# Patient Record
(demographics unavailable — no encounter records)

---

## 2025-01-22 NOTE — HISTORY OF PRESENT ILLNESS
[FreeTextEntry8] : 58M presents for nodules on bilateral arm, right shoulder and scalp. Denies pain. The nodule on the right shoulder has started to bleed intermittently.

## 2025-01-22 NOTE — ASSESSMENT
[FreeTextEntry1] : skin nodules: Referred to dermatology to rule out bcc.  HCM: Discussed FIT for colon ca screening. He will schedule for CPE.

## 2025-01-22 NOTE — REVIEW OF SYSTEMS
[Fever] : no fever [Chills] : no chills [Night Sweats] : no night sweats [Itching] : no itching [Skin Rash] : skin rash

## 2025-01-30 NOTE — HISTORY OF PRESENT ILLNESS
[de-identified] : 58 yr old male PMH hx of aortic aneurysm presents for spots on body presents for many years. Reports they are coming and going. He is not sure whether he has had a skin cancer in the past. He has 4 spots that have developed in the past few years and have not been fully healing.

## 2025-01-30 NOTE — ASSESSMENT
[FreeTextEntry1] : #neoplasm of uncertain behavior right shoulder broad erythematous scaly plaque about 4 cm with rolled borders and telangiectasias bacterial culture taken today to rule out secondary impetiginization  # Neoplasm of uncertain behavior ddx Procedure Note: Biopsy by SHAVE, RIGHT ARM  The risks/benefits/alternatives of skin biopsy were explained to the patient, which include and are not limited to bleeding, infection, scarring or discoloration of skin, and recurrence of lesion. Patient expressed understanding of these risks and provided consent to the procedure. Time out with verification of patient and lesion site was performed. Site was prepped with rubbing alcohol, lidocaine with epinephrine was injected for anesthesia, and biopsy was performed. Specimen sent to path. Procedure was without complication and well tolerated. Wound care was discussed. Rule out bcc vs scc  # Neoplasm of uncertain behavior ddx Procedure Note: Biopsy by SHAVE BIOPSY, RIGHT ARM  The risks/benefits/alternatives of skin biopsy were explained to the patient, which include and are not limited to bleeding, infection, scarring or discoloration of skin, and recurrence of lesion. Patient expressed understanding of these risks and provided consent to the procedure. Time out with verification of patient and lesion site was performed. Site was prepped with rubbing alcohol, lidocaine with epinephrine was injected for anesthesia, and biopsy was performed. Specimen sent to path. Procedure was without complication and well tolerated. Wound care was discussed. Rule out scc vs bcc  # Neoplasm of uncertain behavior ddx Procedure Note: Biopsy by SHAVE BIOPSY, LEFT ARM  The risks/benefits/alternatives of skin biopsy were explained to the patient, which include and are not limited to bleeding, infection, scarring or discoloration of skin, and recurrence of lesion. Patient expressed understanding of these risks and provided consent to the procedure. Time out with verification of patient and lesion site was performed. Site was prepped with rubbing alcohol, lidocaine with epinephrine was injected for anesthesia, and biopsy was performed. Specimen sent to path. Procedure was without complication and well tolerated. Wound care was discussed.  # Neoplasm of uncertain behavior ddx Procedure Note: Biopsy by SHAVE BIOPSY, SCALP The risks/benefits/alternatives of skin biopsy were explained to the patient, which include and are not limited to bleeding, infection, scarring or discoloration of skin, and recurrence of lesion. Patient expressed understanding of these risks and provided consent to the procedure. Time out with verification of patient and lesion site was performed. Site was prepped with rubbing alcohol, lidocaine with epinephrine was injected for anesthesia, and biopsy was performed. Specimen sent to path. Procedure was without complication and well tolerated. Wound care was discussed.   Reschedule for FBSE in 3 weeks

## 2025-02-13 NOTE — ASSESSMENT
[FreeTextEntry1] :   Excision Operative RIGHT ARM BCC Indications:  Alternative therapies were discussed. Given the size, location, and tumor type we decided that excision offers the best option for treatment.   Preoperative diagnosis: BCC Postoperative diagnosis: BCC Same Location: right arm Anesthetic: 1% lidocaine with 1:100,000 epinephrine Antiseptic: Chlorhexidine or Betadine Attending surgeon: Nelsy Wren MD Assistant(s): Jackson Kelly M.D. , MA Estimated blood loss: < 5cc  Complications: None Initial lesion size: 2.5 cm x 1.5 cm Surgical margin: 0.4 cm Total excision size (always includes surgical margin):  3.3 by 2.3 cm Closure type: running  Length of closure: 7 cm Suture material: 4.0 Vicryl, 4.0 prolene  The patient was brought back to the minor surgery operating room. Prior to the procedure the medical record was reviewed by the physician. A pre-procedure checklist in the presence of the patient was reviewed. A surgery " timeout " was observed. The following were confirmed during the surgery timeout: patient name, confirmation of consent, patient position, allergies, type of anesthesia, and antibiotic given (if any, see emr entry for any medications).  The risks of the procedure, including bleeding, infection, nerve damage, possibility of recurrence, failure of the repair, and the certainty of a scar were discussed with the patient. Alternatives to the procedure, as well as their risks and benefits, were also discussed. The patient was offered an opportunity to ask any questions and, after expressing understanding of the proposed procedure and its alternatives, the patient signed the consent form. The patient was placed in appropriate position and the area was prepared and draped in the usual manner. Local anesthesia was obtained with the above mentioned anesthetic. Using a sterile surgical marking pen, an elliptical (or circular) excision was designed around the lesion and along relaxed skin tension lines, if possible, incorporating the margin of normal-appearing skin mentioned above. A full thickness skin incision using a #15 blade Bard-Mitchell scalpel was performed and the lesion was excised sharply in the subcutaneous plane.  The specimen was submitted for histopathologic examination.   The defect was moderately undermined in the subcutaneous plane if needed to reduce wound closure tension and allow for easy wound edge eversion.  Hemostasis was maintained with the electrosurgical unit.  Interrupted, inverted, subcuticular buried mattress sutures were placed using the material mentioned above to approximate the dermal edges of the wound. Interrupted and running simple cutaneous sutures were used to further approximate and fernanda the wound edges.  The final length of the repair is listed in the summary above. A firm pressure dressing was applied over vaseline ointment and Telfa. Verbal postoperative wound care instructions were given and a wound care hand out was dispensed.  The patient was asked to follow up for a wound check as specified. The patient was also told to call us at anytime for signs of wound infection or any other concerns they might have regarding the surgery or the healing process.   The patient tolerated the procedure well and ambulated from the operatory without problem.          LEFT ARM BCC Excision Operative Note   Indications:  Alternative therapies were discussed. Given the size, location, and tumor type we decided that excision offers the best option for treatment.   Preoperative diagnosis: BCC Postoperative diagnosis: Same Location: left arm Anesthetic: 1% lidocaine with 1:100,000 epinephrine Antiseptic: Chlorhexidine or Betadine Attending surgeon: Nelsy Wren MD , Resident: Jackson Kelly M.D. Estimated blood loss: < 5cc  Complications: None Initial lesion size: 3.5 cm x 2 cm Surgical margin: 0.4 cm Total excision size (always includes surgical margin):  4.3 by 2.8 cm Closure type: running and interrupted Length of closure: 7 cm Suture material: 4.0 Vicryl, 4.0 prolene  The patient was brought back to the minor surgery operating room. Prior to the procedure the medical record was reviewed by the physician. A pre-procedure checklist in the presence of the patient was reviewed. A surgery " timeout " was observed. The following were confirmed during the surgery timeout: patient name, confirmation of consent, patient position, allergies, type of anesthesia, and antibiotic given (if any, see emr entry for any medications).  The risks of the procedure, including bleeding, infection, nerve damage, possibility of recurrence, failure of the repair, and the certainty of a scar were discussed with the patient. Alternatives to the procedure, as well as their risks and benefits, were also discussed. The patient was offered an opportunity to ask any questions and, after expressing understanding of the proposed procedure and its alternatives, the patient signed the consent form. The patient was placed in appropriate position and the area was prepared and draped in the usual manner. Local anesthesia was obtained with the above mentioned anesthetic. Using a sterile surgical marking pen, an elliptical (or circular) excision was designed around the lesion and along relaxed skin tension lines, if possible, incorporating the margin of normal-appearing skin mentioned above. A full thickness skin incision using a #15 blade Bard-Mitchell scalpel was performed and the lesion was excised sharply in the subcutaneous plane.  The specimen was submitted for histopathologic examination.   The defect was moderately undermined in the subcutaneous plane if needed to reduce wound closure tension and allow for easy wound edge eversion.  Hemostasis was maintained with the electrosurgical unit.  Interrupted, inverted, subcuticular buried mattress sutures were placed using the material mentioned above to approximate the dermal edges of the wound. Interrupted and running simple cutaneous sutures were used to further approximate and fernanda the wound edges.  The final length of the repair is listed in the summary above. A firm pressure dressing was applied over vaseline ointment and Telfa. Verbal postoperative wound care instructions were given and a wound care hand out was dispensed.  The patient was asked to follow up for a wound check as specified. The patient was also told to call us at anytime for signs of wound infection or any other concerns they might have regarding the surgery or the healing process.   The patient tolerated the procedure well and ambulated from the operatory without problem.          - Discussed aftercare including, no heavy lifting (greater than 10 lbs for 2 weeks), no soaking for 2 weeks.

## 2025-02-24 NOTE — ASSESSMENT
[FreeTextEntry1] : HTN, HLD: He follows with Dr Hammond.  HCM: Discussed contacting [facility] for appointment with GI for colonoscopy for positive. FIT. Labs reviewed. Up to date on shingrix.

## 2025-02-24 NOTE — HEALTH RISK ASSESSMENT
[Good] : ~his/her~  mood as  good [No] : In the past 12 months have you used drugs other than those required for medical reasons? No [0] : 2) Feeling down, depressed, or hopeless: Not at all (0) [PHQ-2 Negative - No further assessment needed] : PHQ-2 Negative - No further assessment needed [Never] : Never [[id number]] : 0 [Change in mental status noted] : No change in mental status noted

## 2025-02-24 NOTE — PHYSICAL EXAM
[No Acute Distress] : no acute distress [Normal Sclera/Conjunctiva] : normal sclera/conjunctiva [PERRL] : pupils equal round and reactive to light [EOMI] : extraocular movements intact [Normal Oropharynx] : the oropharynx was normal [Normal TMs] : both tympanic membranes were normal [No Lymphadenopathy] : no lymphadenopathy [Supple] : supple [No Respiratory Distress] : no respiratory distress  [No Accessory Muscle Use] : no accessory muscle use [Clear to Auscultation] : lungs were clear to auscultation bilaterally [Normal Rate] : normal rate  [Regular Rhythm] : with a regular rhythm [Normal S1, S2] : normal S1 and S2 [No Edema] : there was no peripheral edema [Soft] : abdomen soft [Non Tender] : non-tender [Non-distended] : non-distended [Normal Bowel Sounds] : normal bowel sounds [Normal Posterior Cervical Nodes] : no posterior cervical lymphadenopathy [Normal Anterior Cervical Nodes] : no anterior cervical lymphadenopathy [No Spinal Tenderness] : no spinal tenderness [Grossly Normal Strength/Tone] : grossly normal strength/tone [No Focal Deficits] : no focal deficits [Normal Gait] : normal gait [Deep Tendon Reflexes (DTR)] : deep tendon reflexes were 2+ and symmetric [Normal Affect] : the affect was normal [Normal Insight/Judgement] : insight and judgment were intact [de-identified] : incisional wound bilateral biceps, no discharge or surrounding erythema

## 2025-02-27 NOTE — ASSESSMENT
[FreeTextEntry1] : #history of BCCs #suture removals done for excisions done  two on bl arms treated with excision 2/13/2025 right shoulder and scalp lesions need to be treated by Mohs surgery  30 minutes spent in patient encounter explaining treatment options for BCCs and coordinating care

## 2025-03-20 NOTE — HISTORY OF PRESENT ILLNESS
[FreeTextEntry1] : Mohs surgery for a BCC R shoulder [de-identified] : 03/20/2025 Mohs surgery for a BCC R shoulder   Referred by: Dr. Wren   We had the pleasure of seeing your patient in consultation for Mohs Micrographic Surgery. Mr. RODRIGUEZ CAMILO is a 58 year old M who presents for BCC nodular on the 1. right shoulder 2. scalp He had 2 other BCC bx at the same time on 1/30/2025, which Dr. Wren excised with negative margins  PMH: History of aortic dissection a few years prior.  SH: Lives in [place]   Pertinent positives noted below History of HIV or hepatitis: No Blood thinners: ASA 81mg  Antibiotic Prophylaxis: erythromycin Medical implants: aortic valve 5/2023   The patient's review of systems questionnaire was reviewed. Education needs were identified. There were no barriers to learning.

## 2025-03-20 NOTE — HISTORY OF PRESENT ILLNESS
[FreeTextEntry1] : Mohs surgery for a BCC R shoulder [de-identified] : 03/20/2025 Mohs surgery for a BCC R shoulder   Referred by: Dr. Wren   We had the pleasure of seeing your patient in consultation for Mohs Micrographic Surgery. Mr. RODRIGUEZ CAMILO is a 58 year old M who presents for BCC nodular on the 1. right shoulder 2. scalp He had 2 other BCC bx at the same time on 1/30/2025, which Dr. Wren excised with negative margins  PMH: History of aortic dissection a few years prior.  SH: Lives in [place]   Pertinent positives noted below History of HIV or hepatitis: No Blood thinners: ASA 81mg  Antibiotic Prophylaxis: erythromycin Medical implants: aortic valve 5/2023   The patient's review of systems questionnaire was reviewed. Education needs were identified. There were no barriers to learning.

## 2025-03-20 NOTE — PHYSICAL EXAM
[Alert] : alert [Oriented x 3] : ~L oriented x 3 [Well Nourished] : well nourished [Conjunctiva Non-injected] : conjunctiva non-injected [No Visual Lymphadenopathy] : no visual  lymphadenopathy [No Clubbing] : no clubbing [No Edema] : no edema [No Bromhidrosis] : no bromhidrosis [No Chromhidrosis] : no chromhidrosis [FreeTextEntry3] : R shoulder w/ 2.8x 3.0 cm pink and pearly ill-defined plaque L crown of the scalp with an ill-defined at least 1cm pink patch

## 2025-03-27 NOTE — HISTORY OF PRESENT ILLNESS
[FreeTextEntry1] : SR/wound check [de-identified] : s/p 03/20/2025 Mohs surgery for a BCC R shoulder Here for SR Had some pain kirsten when laying on that side Doing ok nervous about upcoming Mohs for BCC On the vertex scalp. Scheduled for 5/1 which is anniversary of when he was hospitalized for aortic dissection

## 2025-03-27 NOTE — HISTORY OF PRESENT ILLNESS
[FreeTextEntry1] : SR/wound check [de-identified] : s/p 03/20/2025 Mohs surgery for a BCC R shoulder Here for SR Had some pain kirsten when laying on that side Doing ok nervous about upcoming Mohs for BCC On the vertex scalp. Scheduled for 5/1 which is anniversary of when he was hospitalized for aortic dissection

## 2025-05-01 NOTE — HISTORY OF PRESENT ILLNESS
[FreeTextEntry1] : Mohs surgery for a nodular and infiltrative BCC scalp [de-identified] : 03/20/2025 Mohs surgery for a BCC R shoulder 05/01/2025 Mohs surgery for a BCC scalp  Referred by: Dr. Wren   We had the pleasure of seeing your patient for Mohs Micrographic Surgery. Mr. RODRIGUEZ CAMILO is a 58 year old M who presents for BCC nodular on the scalp Had Mohs surgery recently on 03/20/2025 on the R shoulder BCC He had 2 other BCC bx at the same time on 1/30/2025, which Dr. Wren excised with negative margins  PMH: History of aortic dissection a few years prior.  SH: Lives in [place]   Pertinent positives noted below History of HIV or hepatitis: No Blood thinners: ASA 81mg  Antibiotic Prophylaxis: erythromycin Medical implants: aortic valve 5/2023   The patient's review of systems questionnaire was reviewed. Education needs were identified. There were no barriers to learning.

## 2025-05-01 NOTE — PHYSICAL EXAM
[Alert] : alert [Oriented x 3] : ~L oriented x 3 [Well Nourished] : well nourished [Conjunctiva Non-injected] : conjunctiva non-injected [No Visual Lymphadenopathy] : no visual  lymphadenopathy [No Clubbing] : no clubbing [No Edema] : no edema [No Bromhidrosis] : no bromhidrosis [No Chromhidrosis] : no chromhidrosis [FreeTextEntry3] : R shoulder - scar L crown of the scalp with an ill-defined at least 1.5cm pink patch

## 2025-05-07 NOTE — END OF VISIT
"Spoke with Candice, his legal guardian.  Charlie is at [facility] Resident